# Patient Record
Sex: FEMALE | Race: WHITE | Employment: UNEMPLOYED | URBAN - METROPOLITAN AREA
[De-identification: names, ages, dates, MRNs, and addresses within clinical notes are randomized per-mention and may not be internally consistent; named-entity substitution may affect disease eponyms.]

---

## 2018-07-31 ENCOUNTER — HOSPITAL ENCOUNTER (EMERGENCY)
Age: 63
Discharge: HOME | End: 2018-07-31
Attending: EMERGENCY MEDICINE
Payer: MEDICARE

## 2018-07-31 ENCOUNTER — APPOINTMENT (OUTPATIENT)
Dept: GENERAL RADIOLOGY | Age: 63
End: 2018-07-31
Attending: NURSE PRACTITIONER
Payer: MEDICARE

## 2018-07-31 VITALS
DIASTOLIC BLOOD PRESSURE: 81 MMHG | OXYGEN SATURATION: 100 % | RESPIRATION RATE: 18 BRPM | SYSTOLIC BLOOD PRESSURE: 136 MMHG | HEART RATE: 82 BPM
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
## 2018-07-31 DIAGNOSIS — M54.50 MIDLINE LOW BACK PAIN WITHOUT SCIATICA, UNSPECIFIED CHRONICITY: ICD-10-CM

## 2018-07-31 PROCEDURE — 74011250637 HC RX REV CODE- 250/637: Performed by: NURSE PRACTITIONER

## 2018-07-31 PROCEDURE — 72110 X-RAY EXAM L-2 SPINE 4/>VWS: CPT

## 2018-07-31 PROCEDURE — 99283 EMERGENCY DEPT VISIT LOW MDM: CPT

## 2018-07-31 RX ORDER — OXYCODONE HYDROCHLORIDE 5 MG/1
5 TABLET ORAL
Qty: 5 TAB | Refills: 0 | Status: SHIPPED | OUTPATIENT
Start: 2018-07-31 | End: 2019-05-03

## 2018-07-31 RX ORDER — OXYCODONE HYDROCHLORIDE 5 MG/1
5 TABLET ORAL
Status: COMPLETED | OUTPATIENT
Start: 2018-07-31 | End: 2018-07-31

## 2018-07-31 RX ADMIN — OXYCODONE HYDROCHLORIDE 5 MG: 5 TABLET ORAL at 17:23

## 2018-07-31 NOTE — ED NOTES
RN in room for purpose of hourly rounding. Room checked for trash and safety hazards. Patient is. .. 
 
[  ] seated at bedside. [ x ] lying in bed with side rails up and call bell in reach. [  ] lying in with call bell in reach and continuous monitoring in place. Pain reduction interventions. .. [x  ] not indicated at this time 
    include the following 
 [  ] administration of analgesic medications [  ] lights turned down [  ] patient offered a cool washcloth 
 [  ] heat applied [  ] ice applied [  ] patient repositioned Restroom needs addressed. Patient is. ... [x  ] Not in need restroom assistance at this time [  ] Ambulatory as needed to the restroom [  ] Assisted to bedside commode [  ] Assisted with use of bed pan [  ] Provided with a urinal 
 
Position. .. [x  ] Patient does not require assistance with repositioning [  ] Patient repositions with assistance of RN 
[  ] Patient repositioned with assistance of RN and other ED staff.

## 2018-07-31 NOTE — ED TRIAGE NOTES
Pt presents to tx room via EMS w/ c/o of back pain. Pt fell out of bed over the weekend. Pt was at an MD appt and sat on the ground to smoke a ciggarette, and her back locked up on her.

## 2018-07-31 NOTE — ED PROVIDER NOTES
EMERGENCY DEPARTMENT HISTORY AND PHYSICAL EXAM 
 
Date: 7/31/2018 Patient Name: Pillo Saul History of Presenting Illness Chief Complaint Patient presents with  Back Pain History Provided By: Patient Chief Complaint: back pain Duration: 2 Days Timing:  Constant Location: back Severity: 8 out of 10 Modifying Factors: onset after fall on her back Associated Symptoms: denies any other associated signs or symptoms Additional History (Context):  
5:11 PM  
Pillo Saul is a 58 y.o. female with hx of chronic back pain who presents to the emergency department via EMS C/O constant back pain rated 8.5/10 s/p fall out of bed onto her back, 2 days ago. States she was sitting on the ground outside when her back \"locked up\" and she experienced sharp pain. Pt has not taken any OTC medications for her pain, stating she cannot have NSAIDs or Acetaminophen. Pt denies bowel/urinary incontinence, numbness, and any other sxs or complaints. PCP: Balaji Nickerson NP Current Outpatient Prescriptions Medication Sig Dispense Refill  oxyCODONE IR (ROXICODONE) 5 mg immediate release tablet Take 1 Tab by mouth every four (4) hours as needed for Pain. Max Daily Amount: 30 mg. 5 Tab 0  pramipexole (MIRAPEX) 1.5 mg tablet Take 1.5 mg by mouth two (2) times a day.  cyclobenzaprine (FLEXERIL) 10 mg tablet Take 10 mg by mouth three (3) times daily as needed for Muscle Spasm(s).  azithromycin (ZITHROMAX Z-GENNA) 250 mg tablet Take as directed. 1 Each 0  
 guaiFENesin-codeine (ROBITUSSIN AC)  mg/5 mL solution Take 5 mL by mouth three (3) times daily as needed for Cough. Max Daily Amount: 15 mL. 100 mL 0  
 predniSONE (STERAPRED DS) 10 mg dose pack Use as directed 21 Tab 0  
 albuterol (PROVENTIL HFA, VENTOLIN HFA, PROAIR HFA) 90 mcg/actuation inhaler Take 2 Puffs by inhalation every four (4) hours as needed for Wheezing.  1 Inhaler 0  
 hydrocortisone (CORTAID) 1 % topical cream Apply  to affected area two (2) times a day. use thin layer 30 g 0  
 DULoxetine (CYMBALTA) 60 mg capsule Take 120 mg by mouth daily.  oxyCODONE-acetaminophen (PERCOCET 10)  mg per tablet Take 1 Tab by mouth every four (4) hours as needed for Pain.  diazepam (VALIUM) 5 mg tablet Take 5 mg by mouth every six (6) hours as needed for Anxiety.  SUMAtriptan (IMITREX) 20 mg/actuation nasal spray 1 Spray as needed for Migraine. Past History Past Medical History: 
Past Medical History:  
Diagnosis Date  Anxiety  Concussion  Depression  Fibromyalgia  Hypotension  Left shoulder pain  Berger syndrome ([address]  RLS (restless legs syndrome) Past Surgical History: 
Past Surgical History:  
Procedure Laterality Date  HX GASTRIC BYPASS  HX ORTHOPAEDIC    
 knee, shoulder, back Family History: 
History reviewed. No pertinent family history. Social History: 
Social History Substance Use Topics  Smoking status: Current Every Day Smoker Packs/day: 1.50  Smokeless tobacco: Former User  Alcohol use 2.0 oz/week 4 Standard drinks or equivalent per week Allergies: Allergies Allergen Reactions  Bextra [Valdecoxib] Unknown (comments) Pt does not remember  Biaxin [Clarithromycin] Unknown (comments) Pt does not remember  Erythromycin Nausea and Vomiting Review of Systems Review of Systems Gastrointestinal: (-) bowel incontinence Genitourinary: (-) urinary incontinence Musculoskeletal: Positive for back pain. Neurological: Negative for numbness. All other systems reviewed and are negative. Physical Exam  
 
Vitals:  
 07/31/18 1707 07/31/18 1814 BP: 165/86 136/81 Pulse: 80 82 Resp: 16 18 SpO2: 100% 100% Physical Exam  
Constitutional: She is oriented to person, place, and time. She appears well-developed and well-nourished.   
Elderly in appearance, patient appears mildly uncomfortable and is laying on right side HENT:  
Head: Normocephalic and atraumatic. Eyes: Conjunctivae are normal.  
Neck: Normal range of motion. Cardiovascular: Normal rate. Genitourinary:  
Genitourinary Comments: Denies saddle paraesthesia Musculoskeletal:  
     Back: 
 
Unable to assess strength  d/t pain, patient is refusing to lay on back Neurological: She is alert and oriented to person, place, and time. Skin: Skin is warm and dry. Diagnostic Study Results Labs - No results found for this or any previous visit (from the past 12 hour(s)). Radiologic Studies -  
XR SPINE LUMB MIN 4 V Final Result IMPRESSION:  
  
No compression fracture. Multilevel spondylosis and degenerative disc disease,  
most severe at L1-2 and moderate at L3-4, progressed since prior 2013 study. As read by the radiologist.   
 
Medications given in the ED- Medications  
oxyCODONE IR (ROXICODONE) tablet 5 mg (5 mg Oral Given 7/31/18 1723) Medical Decision Making I am the first provider for this patient. I reviewed the vital signs, available nursing notes, past medical history, past surgical history, family history and social history. Vital Signs-Reviewed the patient's vital signs. Pulse Oximetry Analysis - 100% on room air Records Reviewed: Nursing Notes Provider Notes (Medical Decision Making): Patient with fall a couple days ago and increased pain after sitting today. Pain was improved after medication given. Lumbar Xray shows nothing acute. Patient denies red flag symptoms and was able to ambulate without difficulty. Will give limited amount of pain medication for severe pain and ortho follow up. Patient understands reasons to return and is agreeable to treatment plan  
 
Procedures: 
Procedures ED Course:  
5:11 PM Initial assessment performed.  The patients presenting problems have been discussed, and they are in agreement with the care plan formulated and outlined with them. I have encouraged them to ask questions as they arise throughout their visit. 6:55 PM Reports feeling much better after pain medication given. Lumbar XR pending. 7:25 PM Patient states she feels much better. she is able to ambulate without difficulty. Update on X-ray results. Will give limited pain medications for severe pain and for sleeping. Patient understands reasons to return to the ED and is offering no questions or concerns at this time. Diagnosis and Disposition DISCHARGE NOTE: 
7:27 PM  
Klaudia Villalba  results have been reviewed with her. She has been counseled regarding her diagnosis, treatment, and plan. She verbally conveys understanding and agreement of the signs, symptoms, diagnosis, treatment and prognosis and additionally agrees to follow up as discussed. She also agrees with the care-plan and conveys that all of her questions have been answered. I have also provided discharge instructions for her that include: educational information regarding their diagnosis and treatment, and list of reasons why they would want to return to the ED prior to their follow-up appointment, should her condition change. She has been provided with education for proper emergency department utilization. CLINICAL IMPRESSION: 
 
1. Fall, initial encounter 2. Midline low back pain without sciatica, unspecified chronicity PLAN: 
1. D/C Home 2. Discharge Medication List as of 7/31/2018  7:29 PM  
  
START taking these medications Details  
oxyCODONE IR (ROXICODONE) 5 mg immediate release tablet Take 1 Tab by mouth every four (4) hours as needed for Pain. Max Daily Amount: 30 mg., Print, Disp-5 Tab, R-0  
  
  
CONTINUE these medications which have NOT CHANGED  Details  
pramipexole (MIRAPEX) 1.5 mg tablet Take 1.5 mg by mouth two (2) times a day., Historical Med  
  
cyclobenzaprine (FLEXERIL) 10 mg tablet Take 10 mg by mouth three (3) times daily as needed for Muscle Spasm(s). , Historical Med  
  
azithromycin (ZITHROMAX Z-GENNA) 250 mg tablet Take as directed. , Print, Disp-1 Each, R-0  
  
guaiFENesin-codeine (ROBITUSSIN AC)  mg/5 mL solution Take 5 mL by mouth three (3) times daily as needed for Cough. Max Daily Amount: 15 mL. , Print, Disp-100 mL, R-0  
  
predniSONE (STERAPRED DS) 10 mg dose pack Use as directed, Print, Disp-21 Tab, R-0  
  
albuterol (PROVENTIL HFA, VENTOLIN HFA, PROAIR HFA) 90 mcg/actuation inhaler Take 2 Puffs by inhalation every four (4) hours as needed for Wheezing., Print, Disp-1 Inhaler, R-0  
  
hydrocortisone (CORTAID) 1 % topical cream Apply  to affected area two (2) times a day. use thin layer, Print, Disp-30 g, R-0  
  
DULoxetine (CYMBALTA) 60 mg capsule Take 120 mg by mouth daily. , Historical Med  
  
oxyCODONE-acetaminophen (PERCOCET 10)  mg per tablet Take 1 Tab by mouth every four (4) hours as needed for Pain., Historical Med  
  
diazepam (VALIUM) 5 mg tablet Take 5 mg by mouth every six (6) hours as needed for Anxiety. , Historical Med  
  
SUMAtriptan (IMITREX) 20 mg/actuation nasal spray 1 Spray as needed for Migraine., Historical Med 3. Follow-up Information Follow up With Details Comments Contact Info Sunny Barros MD Schedule an appointment as soon as possible for a visit in 2 days For orthopedic follow up [address] [place] [address] [address] [id number] [address] 
[phone] [facility] EMERGENCY DEPT  As needed, If symptoms worsen [address] [address] 
[phone]  
  
 
_______________________________ Attestations: This note is prepared by Yuridia Paul, acting as Scribe for Mounika Turcios NP. Mounika Turcios NP:  The scribe's documentation has been prepared under my direction and personally reviewed by me in its entirety.   I confirm that the note above accurately reflects all work, treatment, procedures, and medical decision making performed by me. 
_______________________________

## 2018-07-31 NOTE — DISCHARGE INSTRUCTIONS
Back Pain: Care Instructions  Your Care Instructions    Back pain has many possible causes. It is often related to problems with muscles and ligaments of the back. It may also be related to problems with the nerves, discs, or bones of the back. Moving, lifting, standing, sitting, or sleeping in an awkward way can strain the back. Sometimes you don't notice the injury until later. Arthritis is another common cause of back pain. Although it may hurt a lot, back pain usually improves on its own within several weeks. Most people recover in 12 weeks or less. Using good home treatment and being careful not to stress your back can help you feel better sooner. Follow-up care is a key part of your treatment and safety. Be sure to make and go to all appointments, and call your doctor if you are having problems. It's also a good idea to know your test results and keep a list of the medicines you take. How can you care for yourself at home? · Sit or lie in positions that are most comfortable and reduce your pain. Try one of these positions when you lie down:  ¨ Lie on your back with your knees bent and supported by large pillows. ¨ Lie on the floor with your legs on the seat of a sofa or chair. Tildon Floss on your side with your knees and hips bent and a pillow between your legs. ¨ Lie on your stomach if it does not make pain worse. · Do not sit up in bed, and avoid soft couches and twisted positions. Bed rest can help relieve pain at first, but it delays healing. Avoid bed rest after the first day of back pain. · Change positions every 30 minutes. If you must sit for long periods of time, take breaks from sitting. Get up and walk around, or lie in a comfortable position. · Try using a heating pad on a low or medium setting for 15 to 20 minutes every 2 or 3 hours. Try a warm shower in place of one session with the heating pad. · You can also try an ice pack for 10 to 15 minutes every 2 to 3 hours.  Put a thin cloth between the ice pack and your skin. · Take pain medicines exactly as directed. ¨ If the doctor gave you a prescription medicine for pain, take it as prescribed. ¨ If you are not taking a prescription pain medicine, ask your doctor if you can take an over-the-counter medicine. · Take short walks several times a day. You can start with 5 to 10 minutes, 3 or 4 times a day, and work up to longer walks. Walk on level surfaces and avoid hills and stairs until your back is better. · Return to work and other activities as soon as you can. Continued rest without activity is usually not good for your back. · To prevent future back pain, do exercises to stretch and strengthen your back and stomach. Learn how to use good posture, safe lifting techniques, and proper body mechanics. When should you call for help? Call your doctor now or seek immediate medical care if:    · You have new or worsening numbness in your legs.     · You have new or worsening weakness in your legs. (This could make it hard to stand up.)     · You lose control of your bladder or bowels.    Watch closely for changes in your health, and be sure to contact your doctor if:    · You have a fever, lose weight, or don't feel well.     · You do not get better as expected. Where can you learn more? Go to http://joão-gayathri.info/. Enter J599 in the search box to learn more about \"Back Pain: Care Instructions. \"  Current as of: November 29, 2017  Content Version: 11.7  © 4119-9105 [employer]. Care instructions adapted under license by [employer] (which disclaims liability or warranty for this information). If you have questions about a medical condition or this instruction, always ask your healthcare professional. [place] [address] any warranty or liability for your use of this information.

## 2018-10-23 ENCOUNTER — APPOINTMENT (OUTPATIENT)
Dept: GENERAL RADIOLOGY | Age: 63
End: 2018-10-23
Attending: PHYSICIAN ASSISTANT
Payer: MEDICARE

## 2018-10-23 ENCOUNTER — HOSPITAL ENCOUNTER (EMERGENCY)
Age: 63
Discharge: HOME | End: 2018-10-23
Attending: EMERGENCY MEDICINE
Payer: MEDICARE

## 2018-10-23 VITALS
BODY MASS INDEX: 22.82 KG/M2 | DIASTOLIC BLOOD PRESSURE: 71 MMHG | WEIGHT: 142 LBS | SYSTOLIC BLOOD PRESSURE: 152 MMHG | OXYGEN SATURATION: 100 % | RESPIRATION RATE: 18 BRPM | TEMPERATURE: 98.1 F | HEIGHT: 66 IN

## 2018-10-23 DIAGNOSIS — Z98.890 HISTORY OF BUNIONECTOMY OF RIGHT GREAT TOE: ICD-10-CM

## 2018-10-23 DIAGNOSIS — S90.31XA CONTUSION OF RIGHT FOOT, INITIAL ENCOUNTER: Primary | ICD-10-CM

## 2018-10-23 PROCEDURE — 99283 EMERGENCY DEPT VISIT LOW MDM: CPT

## 2018-10-23 PROCEDURE — L3908 WHO COCK-UP NONMOLDE PRE OTS: HCPCS

## 2018-10-23 PROCEDURE — 74011250637 HC RX REV CODE- 250/637: Performed by: PHYSICIAN ASSISTANT

## 2018-10-23 PROCEDURE — 77030036687 HC SHOE PSTOP S2SG -A

## 2018-10-23 PROCEDURE — 73630 X-RAY EXAM OF FOOT: CPT

## 2018-10-23 RX ORDER — CHOLECALCIFEROL TAB 125 MCG (5000 UNIT) 125 MCG
TAB ORAL DAILY
COMMUNITY

## 2018-10-23 RX ORDER — SUCRALFATE 1 G/1
1 TABLET ORAL DAILY
COMMUNITY

## 2018-10-23 RX ORDER — OXYCODONE AND ACETAMINOPHEN 5; 325 MG/1; MG/1
1 TABLET ORAL
Status: COMPLETED | OUTPATIENT
Start: 2018-10-23 | End: 2018-10-23

## 2018-10-23 RX ORDER — ONDANSETRON 4 MG/1
4 TABLET, ORALLY DISINTEGRATING ORAL
Status: DISCONTINUED | OUTPATIENT
Start: 2018-10-23 | End: 2018-10-23

## 2018-10-23 RX ORDER — POLYETHYLENE GLYCOL 3350 17 G/17G
17 POWDER, FOR SOLUTION ORAL DAILY
COMMUNITY

## 2018-10-23 RX ORDER — SUMATRIPTAN 25 MG/1
25 TABLET, FILM COATED ORAL
COMMUNITY

## 2018-10-23 RX ADMIN — OXYCODONE HYDROCHLORIDE AND ACETAMINOPHEN 1 TABLET: 5; 325 TABLET ORAL at 11:33

## 2018-10-23 NOTE — DISCHARGE INSTRUCTIONS
Contusion: Care Instructions  Your Care Instructions    Contusion is the medical term for a bruise. It is the result of a direct blow or an impact, such as a fall. Contusions are common sports injuries. Most people think of a bruise as a black-and-blue spot. This happens when small blood vessels get torn and leak blood under the skin. But bones, muscles, and organs can also get bruised. This may damage deep tissues but not cause a bruise you can see. The doctor will do a physical exam to find the location of your contusion. You may also have tests to make sure you do not have a more serious injury, such as a broken bone or nerve damage. These may include X-rays or other imaging tests like a CT scan or MRI. Deep-tissue contusions may cause pain and swelling. But if there is no serious damage, they will often get better in a few weeks with home treatment. The doctor has checked you carefully, but problems can develop later. If you notice any problems or new symptoms, get medical treatment right away. Follow-up care is a key part of your treatment and safety. Be sure to make and go to all appointments, and call your doctor if you are having problems. It's also a good idea to know your test results and keep a list of the medicines you take. How can you care for yourself at home? · Put ice or a cold pack on the sore area for 10 to 20 minutes at a time to stop swelling. Put a thin cloth between the ice pack and your skin. · Be safe with medicines. Read and follow all instructions on the label. ? If the doctor gave you a prescription medicine for pain, take it as prescribed. ? If you are not taking a prescription pain medicine, ask your doctor if you can take an over-the-counter medicine. · If you can, prop up the sore area on pillows as much as possible for the next few days. Try to keep the sore area above the level of your heart. When should you call for help?   Call your doctor now or seek immediate medical care if:    · Your pain gets worse.     · You have new or worse swelling.     · You have tingling, weakness, or numbness in the area near the contusion.     · The area near the contusion is cold or pale.    Watch closely for changes in your health, and be sure to contact your doctor if:    · You do not get better as expected. Where can you learn more? Go to http://joão-gayathri.info/. Enter R594 in the search box to learn more about \"Contusion: Care Instructions. \"  Current as of: November 20, 2017  Content Version: 11.8  © 2138-3366 [employer]. Care instructions adapted under license by [employer] (which disclaims liability or warranty for this information). If you have questions about a medical condition or this instruction, always ask your healthcare professional. [address] any warranty or liability for your use of this information.

## 2018-10-23 NOTE — ED PROVIDER NOTES
EMERGENCY DEPARTMENT HISTORY AND PHYSICAL EXAM 
 
Date: 10/23/2018 Patient Name: Stephane Baumgarten History of Presenting Illness Chief Complaint Patient presents with  Foot Pain RIGHT History Provided By: Patient Chief Complaint: foot pain Duration: 8 Hours Timing:  Acute Location: right Modifying Factors: No medication taken for symptoms Associated Symptoms: right foot swelling, right foot contusion, and right foot numbness Additional History (Context):  
10:04 AM 
Stephane Baumgarten is a 58 y.o. female with PMHX of right foot bunionectomy who presents to the emergency department C/O right foot pain onset 8 hours ago after dropping a steal three hole , 5-10 lbs, on her right foot. Associated symptoms include right foot swelling, right foot contusion, and right foot numbness. Pain is worse with movement. No medication taken for symptoms. Ambulating with a limp. Pt denies any other Sx or complaints. PCP: France Mortensen NP Current Outpatient Medications Medication Sig Dispense Refill  SUMAtriptan (IMITREX) 25 mg tablet Take 25 mg by mouth once as needed for Migraine.  cholecalciferol, VITAMIN D3, (VITAMIN D3) 5,000 unit tab tablet Take  by mouth daily.  polyethylene glycol (MIRALAX) 17 gram packet Take 17 g by mouth daily.  sucralfate (CARAFATE) 1 gram tablet Take 1 g by mouth daily.  oxyCODONE IR (ROXICODONE) 5 mg immediate release tablet Take 1 Tab by mouth every four (4) hours as needed for Pain. Max Daily Amount: 30 mg. 5 Tab 0  
 azithromycin (ZITHROMAX Z-GENNA) 250 mg tablet Take as directed. 1 Each 0  
 guaiFENesin-codeine (ROBITUSSIN AC)  mg/5 mL solution Take 5 mL by mouth three (3) times daily as needed for Cough. Max Daily Amount: 15 mL.  100 mL 0  
 predniSONE (STERAPRED DS) 10 mg dose pack Use as directed 21 Tab 0  
 albuterol (PROVENTIL HFA, VENTOLIN HFA, PROAIR HFA) 90 mcg/actuation inhaler Take 2 Puffs by inhalation every four (4) hours as needed for Wheezing. 1 Inhaler 0  
 hydrocortisone (CORTAID) 1 % topical cream Apply  to affected area two (2) times a day. use thin layer 30 g 0  
 DULoxetine (CYMBALTA) 60 mg capsule Take 120 mg by mouth daily.  oxyCODONE-acetaminophen (PERCOCET 10)  mg per tablet Take 1 Tab by mouth every four (4) hours as needed for Pain.  pramipexole (MIRAPEX) 1.5 mg tablet Take 0.5 mg by mouth three (3) times daily.  cyclobenzaprine (FLEXERIL) 10 mg tablet Take 10 mg by mouth three (3) times daily as needed for Muscle Spasm(s).  diazepam (VALIUM) 5 mg tablet Take 5 mg by mouth every six (6) hours as needed for Anxiety.  SUMAtriptan (IMITREX) 20 mg/actuation nasal spray 1 Spray as needed for Migraine. Past History Past Medical History: 
Past Medical History:  
Diagnosis Date  Anxiety  Concussion  Depression  Fibromyalgia  Hypotension  Left shoulder pain  Berger syndrome ([facility] [address]  RLS (restless legs syndrome) Past Surgical History: 
Past Surgical History:  
Procedure Laterality Date  HX GASTRIC BYPASS  HX ORTHOPAEDIC    
 knee, shoulder, back Family History: 
History reviewed. No pertinent family history. Social History: 
Social History Tobacco Use  Smoking status: Current Every Day Smoker Packs/day: 1.50  Smokeless tobacco: Former User Substance Use Topics  Alcohol use: Yes Alcohol/week: 2.0 oz Types: 4 Standard drinks or equivalent per week  Drug use: No  
 
 
Allergies: Allergies Allergen Reactions  Ambien [Zolpidem] Other (comments) Psychological reason  Aspirin Other (comments) GI distress  Bextra [Valdecoxib] Unknown (comments) Pt does not remember  Biaxin [Clarithromycin] Unknown (comments) Pt does not remember  Erythromycin Nausea and Vomiting  Nsaids (Non-Steroidal Anti-Inflammatory Drug) Other (comments) GI distress  Sulfamethoxazole-Trimethoprim Unknown (comments) Patient reports does not remember  Tolmetin Other (comments) Intolerance  Oxycontin [Oxycodone] Other (comments) GI distress Review of Systems Review of Systems Musculoskeletal: Positive for arthralgias and myalgias (foot pain). Skin: Positive for color change. (+) right foot contusion 
(+) right foot swelling Neurological: Positive for numbness. Negative for weakness. Hematological: Does not bruise/bleed easily. All other systems reviewed and are negative. Physical Exam  
 
Vitals:  
 10/23/18 8360 BP: 152/71 Resp: 18 Temp: 98.1 °F (36.7 °C) SpO2: 100% Weight: 64.4 kg (142 lb) Height: 5' 6\" (1.676 m) Physical Exam  
Constitutional: She is oriented to person, place, and time. She appears well-developed and well-nourished. No distress.  female in NAD. Alert. Appears uncomfortable at times. HENT:  
Head: Normocephalic and atraumatic. Right Ear: External ear normal.  
Left Ear: External ear normal.  
Eyes: Conjunctivae are normal.  
Neck: Normal range of motion. Cardiovascular: Normal rate, regular rhythm, normal heart sounds and intact distal pulses. Exam reveals no gallop and no friction rub. No murmur heard. Pulses: 
     Dorsalis pedis pulses are 2+ on the right side, and 2+ on the left side. Posterior tibial pulses are 2+ on the right side, and 2+ on the left side. Pulmonary/Chest: Effort normal and breath sounds normal. No accessory muscle usage. No tachypnea. She has no decreased breath sounds. She has no rhonchi. Abdominal: Soft. Musculoskeletal: Normal range of motion. She exhibits no edema. Right foot: There is tenderness and swelling. There is normal range of motion and normal capillary refill. Feet: Neurological: She is alert and oriented to person, place, and time. Skin: Skin is warm and dry. No rash noted. She is not diaphoretic. No erythema. Psychiatric: She has a normal mood and affect. Judgment normal.  
Nursing note and vitals reviewed. Diagnostic Study Results Labs - No results found for this or any previous visit (from the past 12 hour(s)). Radiologic Studies -  
XR FOOT RT MIN 3 V Final Result IMPRESSION: No acute osseous abnormality identified. As read by the radiologist.  
 
CT Results  (Last 48 hours) None CXR Results  (Last 48 hours) None Medications given in the ED- Medications  
oxyCODONE-acetaminophen (PERCOCET) 5-325 mg per tablet 1 Tab (1 Tab Oral Given 10/23/18 3453) Medical Decision Making I am the first provider for this patient. I reviewed the vital signs, available nursing notes, past medical history, past surgical history, family history and social history. Vital Signs-Reviewed the patient's vital signs. Pulse Oximetry Analysis - 100% on RA Records Reviewed: Nursing Notes and Old Medical Records Provider Notes (Medical Decision Making): sprain, strain, contusion, fx, hardware failure Procedures: 
Procedures ED Course:  
10:04 AM Initial assessment performed. The patients presenting problems have been discussed, and they are in agreement with the care plan formulated and outlined with them. I have encouraged them to ask questions as they arise throughout their visit. 11:12 AM Xray was unremarkable. Pt states that Norco doesnt work and requesting Percocet but explained there is no broken bone and cannot rx Percocet. Again, offered Norco but shes refusing. Will give one dose of Percocet here and discharge home with podiatry follow up. 11:40 AM Notified by RN that pt is refusing post op shoe. She refuses Zofran and accepted Percocet in ED. Will give podiatry follow up.  Reasons to RTED discussed with pt. All questions answered. Pt feels comfortable going home at this time. Pt expressed understanding and she agrees with plan. Diagnosis and Disposition DISCHARGE NOTE: 
11:14 AM 
Jonn Espinosa  results have been reviewed with her. She has been counseled regarding her diagnosis, treatment, and plan. She verbally conveys understanding and agreement of the signs, symptoms, diagnosis, treatment and prognosis and additionally agrees to follow up as discussed. She also agrees with the care-plan and conveys that all of her questions have been answered. I have also provided discharge instructions for her that include: educational information regarding their diagnosis and treatment, and list of reasons why they would want to return to the ED prior to their follow-up appointment, should her condition change. She has been provided with education for proper emergency department utilization. CLINICAL IMPRESSION: 
 
1. Contusion of right foot, initial encounter 2. History of bunionectomy of right great toe PLAN: 
1. D/C Home 2. Discharge Medication List as of 10/23/2018 11:15 AM  
  
 
3. Follow-up Information Follow up With Specialties Details Why Contact Lex Whaley DPM Podiatry Schedule an appointment as soon as possible for a visit For follow up with podiatry [address] [place] [address] 
[phone] [facility] EMERGENCY DEPT Emergency Medicine Go to As needed, as symptoms worsen [address] [address] 
[phone]  
  
 
_______________________________ Attestations: This note is prepared by Elliot Handley, acting as Scribe for [facility]. [facility]:  The scribe's documentation has been prepared under my direction and personally reviewed by me in its entirety.   I confirm that the note above accurately reflects all work, treatment, procedures, and medical decision making performed by me. 
_______________________________

## 2019-05-03 ENCOUNTER — HOSPITAL ENCOUNTER (EMERGENCY)
Age: 64
Discharge: HOME | End: 2019-05-03
Attending: EMERGENCY MEDICINE | Admitting: EMERGENCY MEDICINE
Payer: MEDICARE

## 2019-05-03 VITALS
DIASTOLIC BLOOD PRESSURE: 61 MMHG | HEART RATE: 80 BPM | OXYGEN SATURATION: 99 % | RESPIRATION RATE: 16 BRPM | SYSTOLIC BLOOD PRESSURE: 145 MMHG | BODY MASS INDEX: 22.82 KG/M2 | TEMPERATURE: 98.7 F | HEIGHT: 66 IN | WEIGHT: 142 LBS

## 2019-05-03 DIAGNOSIS — S61.432A PUNCTURE WOUND OF LEFT HAND WITHOUT FOREIGN BODY, INITIAL ENCOUNTER: Primary | ICD-10-CM

## 2019-05-03 PROCEDURE — 99282 EMERGENCY DEPT VISIT SF MDM: CPT

## 2019-05-03 NOTE — ED PROVIDER NOTES
EMERGENCY DEPARTMENT HISTORY AND PHYSICAL EXAM 
 
Date: 5/3/2019 Patient Name: Eduin Bonilla History of Presenting Illness Chief Complaint Patient presents with  Foreign Body History Provided By: Patient Eduin Bonilla is a 71 y.o. femalepresents to the emergency department C/O puncture wound. Associated sxs include left hand wound with possible foreign body. Patient reports last night while using a towel to clean a spill on the floor rubbed her hand over the floor and sustaining a puncture wound to the left palm from the toothpick. Patient states the toothpick was sticking straight out of the hand she was able to pull the toothpick out and it appeared completely intact and not splintered. Patient has a very small scabbed wound to left hand patient states she has had no pain and is not been any drainage. Patient concern is her last tetanus was either 7 or 8 years ago. Patient is using topical antibiotic ointment. Pt denies hand pain drainage redness, and any other sxs or complaints. PCP: Ruba Hendricks NP Current Outpatient Medications Medication Sig Dispense Refill  polyethylene glycol (MIRALAX) 17 gram packet Take 17 g by mouth daily.  pramipexole (MIRAPEX) 1.5 mg tablet Take 0.5 mg by mouth three (3) times daily.  cyclobenzaprine (FLEXERIL) 10 mg tablet Take 10 mg by mouth three (3) times daily as needed for Muscle Spasm(s).  SUMAtriptan (IMITREX) 25 mg tablet Take 25 mg by mouth once as needed for Migraine.  cholecalciferol, VITAMIN D3, (VITAMIN D3) 5,000 unit tab tablet Take  by mouth daily.  sucralfate (CARAFATE) 1 gram tablet Take 1 g by mouth daily.  albuterol (PROVENTIL HFA, VENTOLIN HFA, PROAIR HFA) 90 mcg/actuation inhaler Take 2 Puffs by inhalation every four (4) hours as needed for Wheezing. 1 Inhaler 0  
 SUMAtriptan (IMITREX) 20 mg/actuation nasal spray 1 Spray as needed for Migraine. Past History Past Medical History: 
Past Medical History:  
Diagnosis Date  Anxiety  Concussion  Depression  Fibromyalgia  Hypotension  Left shoulder pain  Berger syndrome ([address]  RLS (restless legs syndrome) Past Surgical History: 
Past Surgical History:  
Procedure Laterality Date  HX GASTRIC BYPASS  HX ORTHOPAEDIC    
 knee, shoulder, back Family History: 
History reviewed. No pertinent family history. Social History: 
Social History Tobacco Use  Smoking status: Current Every Day Smoker Packs/day: 1.50  Smokeless tobacco: Former User Substance Use Topics  Alcohol use: Yes Alcohol/week: 2.0 oz Types: 4 Standard drinks or equivalent per week  Drug use: No  
 
 
Allergies: Allergies Allergen Reactions  Ambien [Zolpidem] Other (comments) Psychological reason  Aspirin Other (comments) GI distress  Bextra [Valdecoxib] Unknown (comments) Pt does not remember  Biaxin [Clarithromycin] Unknown (comments) Pt does not remember  Erythromycin Nausea and Vomiting  Nsaids (Non-Steroidal Anti-Inflammatory Drug) Other (comments) GI distress  Sulfamethoxazole-Trimethoprim Unknown (comments) Patient reports does not remember  Tolmetin Other (comments) Intolerance  Oxycontin [Oxycodone] Other (comments) GI distress Review of Systems Review of Systems Constitutional: Negative for fever. Musculoskeletal: Negative for arthralgias and joint swelling. Skin: Positive for wound (Puncture wound left hand). Neurological: Negative for numbness. All other systems reviewed and are negative. Physical Exam  
 
Vitals:  
 05/03/19 1445 BP: 145/61 Pulse: 80 Resp: 16 Temp: 98.7 °F (37.1 °C) SpO2: 99% Weight: 64.4 kg (142 lb) Height: 5' 6\" (1.676 m) Physical Exam  
Constitutional: She is oriented to person, place, and time.  She appears well-developed and well-nourished. No distress. HENT:  
Head: Normocephalic and atraumatic. Eyes: Pupils are equal, round, and reactive to light. Conjunctivae and EOM are normal.  
Neck: Normal range of motion. Neck supple. Musculoskeletal: Normal range of motion. Full range of motion all fingers neurovascular intact Neurological: She is alert and oriented to person, place, and time. Skin: Skin is warm and dry. Left palm approximately 3 cm proximal to index MCP joint with very small scabbed wound without swelling ecchymosis erythema warmth tenderness or obvious retained foreign body. Psychiatric: She has a normal mood and affect. Her behavior is normal.  
Nursing note and vitals reviewed. Diagnostic Study Results Labs - No results found for this or any previous visit (from the past 12 hour(s)). Radiologic Studies - No orders to display CT Results  (Last 48 hours) None CXR Results  (Last 48 hours) None Medications given in the ED- Medications - No data to display Medical Decision Making I am the first provider for this patient. I reviewed the vital signs, available nursing notes, past medical history, past surgical history, family history and social history. Vital Signs-Reviewed the patient's vital signs. Records Reviewed: Nursing Notes Procedures: 
Procedures ED Course:  
Initial assessment performed. The patients presenting problems have been discussed, and they are in agreement with the care plan formulated and outlined with them. I have encouraged them to ask questions as they arise throughout their visit. Discussion: 71 y.o. female with uncomplicated left hand puncture wound from a toothpick approximately 14 hours ago.   No signs of infection or retained foreign body neurovascular intact full range of motion and nontender on exam.  Plan for topical antibiotics wound cleansing and PCP follow-up. Strict return precautions discussed. Diagnosis and Disposition DISCHARGE NOTE: 
Dennis Wright  results have been reviewed with her. She has been counseled regarding her diagnosis, treatment, and plan. She verbally conveys understanding and agreement of the signs, symptoms, diagnosis, treatment and prognosis and additionally agrees to follow up as discussed. She also agrees with the care-plan and conveys that all of her questions have been answered. I have also provided discharge instructions for her that include: educational information regarding their diagnosis and treatment, and list of reasons why they would want to return to the ED prior to their follow-up appointment, should her condition change. She has been provided with education for proper emergency department utilization. CLINICAL IMPRESSION: 
 
No diagnosis found. PLAN: 
1. D/C Home 2. Current Discharge Medication List  
  
 
3. Follow-up Information None Please note that this dictation was completed with [employer], the computer voice recognition software. Quite often unanticipated grammatical, syntax, homophones, and other interpretive errors are inadvertently transcribed by the computer software. Please disregard these errors. Please excuse any errors that have escaped final proofreading.

## 2019-05-03 NOTE — DISCHARGE INSTRUCTIONS
Patient Education        Puncture Wounds: Care Instructions  Your Care Instructions    A puncture wound can happen anywhere on your body. These wounds tend to be narrower and deeper than cuts. A puncture wound is usually left open instead of being closed. This is because a puncture wound can be easily infected, and closing it can make infection even more likely. You will probably have a bandage over the wound. The doctor has checked you carefully, but problems can develop later. If you notice any problems or new symptoms, get medical treatment right away. Follow-up care is a key part of your treatment and safety. Be sure to make and go to all appointments, and call your doctor if you are having problems. It's also a good idea to know your test results and keep a list of the medicines you take. How can you care for yourself at home? · Keep the wound dry for the first 24 to 48 hours. After this, you can shower if your doctor okays it. Pat the wound dry. · Don't soak the wound, such as in a bathtub. Your doctor will tell you when it's safe to get the wound wet. · If your doctor told you how to care for your wound, follow your doctor's instructions. If you did not get instructions, follow this general advice:  ? After the first 24 to 48 hours, wash the wound with clean water 2 times a day. Don't use hydrogen peroxide or alcohol, which can slow healing. ? You may cover the wound with a thin layer of petroleum jelly, such as Vaseline, and a nonstick bandage. ? Apply more petroleum jelly and replace the bandage as needed. · Prop up the sore area on pillows anytime you sit or lie down during the next 3 days. Try to keep it above the level of your heart. This helps reduce swelling. · Avoid any activity that could cause your wound to get worse. · Be safe with medicines. Read and follow all instructions on the label. ? If the doctor gave you a prescription medicine for pain, take it as prescribed.   ? If you are not taking a prescription pain medicine, ask your doctor if you can take an over-the-counter medicine. · If your doctor prescribed antibiotics, take them as directed. Do not stop taking them just because you feel better. You need to take the full course of antibiotics. When should you call for help? Call your doctor now or seek immediate medical care if:    · You have new pain, or your pain gets worse.     · The wound starts to bleed, and blood soaks through the bandage. Oozing small amounts of blood is normal.     · The skin near the wound is cold or pale or changes color.     · You have tingling, weakness, or numbness near the wound.     · You have trouble moving the area near the wound.     · You have symptoms of infection, such as:  ? Increased pain, swelling, warmth, or redness around the wound. ? Red streaks leading from the wound. ? Pus draining from the wound. ? A fever.    Watch closely for changes in your health, and be sure to contact your doctor if:    · The wound is not closing (getting smaller).     · You do not get better as expected. Where can you learn more? Go to http://joão-gayathri.info/. Enter B280 in the search box to learn more about \"Puncture Wounds: Care Instructions. \"  Current as of: September 23, 2018  Content Version: 11.9  © 5889-7220 [employer]. Care instructions adapted under license by [employer] (which disclaims liability or warranty for this information). If you have questions about a medical condition or this instruction, always ask your healthcare professional. [place] [address] any warranty or liability for your use of this information.

## 2019-05-03 NOTE — ED TRIAGE NOTES
Triage: pt reports that her left hand was poked with a wooden toothpick last night. Pt concerned that there may be a sliver of wood in hand.